# Patient Record
Sex: MALE | Race: OTHER | HISPANIC OR LATINO
[De-identification: names, ages, dates, MRNs, and addresses within clinical notes are randomized per-mention and may not be internally consistent; named-entity substitution may affect disease eponyms.]

---

## 2019-03-26 PROBLEM — Z00.00 ENCOUNTER FOR PREVENTIVE HEALTH EXAMINATION: Status: ACTIVE | Noted: 2019-03-26

## 2019-04-12 ENCOUNTER — APPOINTMENT (OUTPATIENT)
Dept: GASTROENTEROLOGY | Facility: CLINIC | Age: 64
End: 2019-04-12
Payer: COMMERCIAL

## 2019-04-12 VITALS
WEIGHT: 150 LBS | SYSTOLIC BLOOD PRESSURE: 126 MMHG | HEART RATE: 73 BPM | HEIGHT: 66 IN | DIASTOLIC BLOOD PRESSURE: 83 MMHG | TEMPERATURE: 97.89 F | BODY MASS INDEX: 24.11 KG/M2

## 2019-04-12 DIAGNOSIS — Z86.79 PERSONAL HISTORY OF OTHER DISEASES OF THE CIRCULATORY SYSTEM: ICD-10-CM

## 2019-04-12 DIAGNOSIS — Z78.9 OTHER SPECIFIED HEALTH STATUS: ICD-10-CM

## 2019-04-12 DIAGNOSIS — Z86.73 PERSONAL HISTORY OF TRANSIENT ISCHEMIC ATTACK (TIA), AND CEREBRAL INFARCTION W/OUT RESIDUAL DEFICITS: ICD-10-CM

## 2019-04-12 LAB — HEMOCCULT STL QL: NEGATIVE

## 2019-04-12 PROCEDURE — 99213 OFFICE O/P EST LOW 20 MIN: CPT

## 2019-04-12 RX ORDER — POLYETHYLENE GLYCOL 3350, SODIUM CHLORIDE, SODIUM BICARBONATE AND POTASSIUM CHLORIDE WITH LEMON FLAVOR 420; 11.2; 5.72; 1.48 G/4L; G/4L; G/4L; G/4L
420 POWDER, FOR SOLUTION ORAL
Qty: 1 | Refills: 0 | Status: ACTIVE | COMMUNITY
Start: 2019-04-12 | End: 1900-01-01

## 2019-04-12 RX ORDER — LISINOPRIL 10 MG/1
10 TABLET ORAL
Refills: 0 | Status: ACTIVE | COMMUNITY

## 2019-04-12 NOTE — ASSESSMENT
[FreeTextEntry1] : Abdominal Pain: The patient complains of abdominal pain. The patient is to avoid nonsteroidal anti-inflammatory drugs and aspirin. I recommend continue on a trial of Omeprazole 40 mg once a day for 3 months for the symptoms.  \par Dyspepsia: The patient complains of dyspeptic symptoms.  The patient was advised to abide by an anti-gas diet.  The patient was given a pamphlet for anti-gas.  The patient and I reviewed the anti-gas diet at length. The patient is to start on a trial of Phazyme one tablet 3 times a day p.r.n. abdominal pain and gas.\par Upper Endoscopy and Colonoscopy: I recommend an upper endoscopy and colonoscopy to assess the symptoms.  The patient was told of the risks and benefits of the procedure.  The patient was told of the risks of perforation, emergency surgery, bleeding, infections and missed lesions.  The patient agreed and will schedule for the procedure. The patient can take the antihypertensive medication with a sip of water one hour prior to the procedure. . The patient is to be n.p.o. after midnight and bowel prep was given.  The patient is to return for the procedure. \par Colon Cancer Screening: I recommend a colonoscopy to assess for high risk colon cancer screening.  The patient's mother had a history of colon cancer.  The patient was told of the risks and benefits of the procedure.  The patient was told of the risks of perforation, emergency surgery, bleeding, infections and missed lesions.  The patient agreed and will schedule for the procedure. The patient can take the antihypertensive medication with a sip of water one hour prior to the procedure. . The patient is to be n.p.o. after midnight and bowel prep was given.  The patient is to return for the procedure. \par I recommend follow-up in 3 weeks to reassess the symptoms and discuss the findings.\par \par \par \par \par \par \par \par \par

## 2019-04-12 NOTE — HISTORY OF PRESENT ILLNESS
[Heartburn] : denies heartburn [Nausea] : denies nausea [Vomiting] : denies vomiting [Diarrhea] : denies diarrhea [Yellow Skin Or Eyes (Jaundice)] : denies jaundice [Rectal Pain] : denies rectal pain [Constipation] : constipation [Abdominal Pain] : abdominal pain [Abdominal Swelling] : abdominal swelling [Hiatus Hernia] : hiatus hernia [Wt Gain ___ Lbs] : no recent weight gain [Wt Loss ___ Lbs] : no recent weight loss [GERD] : no gastroesophageal reflux disease [Peptic Ulcer Disease] : no peptic ulcer disease [Cholelithiasis] : no cholelithiasis [Pancreatitis] : no pancreatitis [Kidney Stone] : no kidney stone [Inflammatory Bowel Disease] : no inflammatory bowel disease [Irritable Bowel Syndrome] : no irritable bowel syndrome [Diverticulitis] : no diverticulitis [Alcohol Abuse] : no alcohol abuse [Malignancy] : no malignancy [Abdominal Surgery] : no abdominal surgery [Appendectomy] : no appendectomy [de-identified] : The patient states that he is feeling uncomfortable.  The patient is recovering from a CVA.  The patient was hospitalized at [facility] in September 2018 for the new onset CVA.  The patient denies any jaundice or pruritus.  The patient complains of chronic lower back pain. The patient complains of abdominal pain.  The patient describes the abdominal pain as a crampy, burning, intermittent diffuse abdominal discomfort that is nonradiating in nature.  The abdominal pain is unrelated to meals.  The abdominal pain improves with passing gas or having a bowel movement.  The abdominal pain is described as being mild in nature.  The abdominal pain occurs at night and in the morning.  The abdominal pain can occur at any time.   The abdominal pain has awakened the patient from sleep.  The abdominal pain is minimally relieved with certain medication such as proton pump inhibitors, H2 blockers and antacids.  The abdominal pain is associated with abdominal gas and bloating.  The patient denies any nausea or vomiting.  The patient denies any gastroesophageal reflux disease or dysphagia. The patient denies any atypical chest pain, shortness of breath or palpitations.  The patient denies any diaphoresis. The patient complains of constipation but denies any diarrhea.  The patient has 1 to 2 bowel movements e very 1 to 3 days.  The patient complains of a change in bowel habits.  The patient complains of a change in caliber of stool.   The patient denies having mucus discharge with the bowel movements.  The patient denies any bright red blood per rectum, melena or hematemesis.  The patient denies any rectal pain or rectal pruritus.   The patient denies any weight loss or anorexia.  He denies any fevers or chills.  The upper endoscopy performed at the office on August 08, 2017 revealed mild diffuse bile gastritis with linear antral erosions.  The pathology revealed distal esophagus with unremarkable squamous mucosa with no evidence of intestinal metaplasia, eosinophilic esophagitis or dysplasia, gastric antral mucosa with mild chronic gastritis with no evidence of intestinal metaplasia or dysplasia that was negative for Helicobacter pylori, gastric body mucosa with unremarkable gastric mucosa with no evidence of intestinal metaplasia that was negative for Helicobacter pylori and unremarkable duodenal mucosa with no evidence of celiac disease, duodenitis or parasites. The colonoscopy to the terminal ileum performed at the office on April 22, 2014 revealed a normal but long and tortuous colon, a poor prep colonoscopy and small internal hemorrhoids.  The patient tolerated the procedures well.   The abdominal ultrasound performed by his PMD on November 2, 2015 revealed probable nonobstructing renal calculus in the left lower pole with no left hydronephrosis otherwise unremarkable study.  The patient admits to having a prior upper endoscopy performed by another gastroenterologist.  According to the patient, the upper endoscopic findings revealed gastroesophageal reflux disease.  The patient admits to a family history of GI problems.  The patient’s mother had a history of colonic cancer. [Cholecystectomy] : no cholecystectomy

## 2019-04-26 ENCOUNTER — TRANSCRIPTION ENCOUNTER (OUTPATIENT)
Age: 64
End: 2019-04-26

## 2019-05-14 ENCOUNTER — RESULT REVIEW (OUTPATIENT)
Age: 64
End: 2019-05-14

## 2019-05-14 ENCOUNTER — APPOINTMENT (OUTPATIENT)
Dept: GASTROENTEROLOGY | Facility: HOSPITAL | Age: 64
End: 2019-05-14

## 2019-05-14 ENCOUNTER — OUTPATIENT (OUTPATIENT)
Dept: OUTPATIENT SERVICES | Facility: HOSPITAL | Age: 64
LOS: 1 days | End: 2019-05-14
Payer: COMMERCIAL

## 2019-05-14 DIAGNOSIS — K64.8 OTHER HEMORRHOIDS: ICD-10-CM

## 2019-05-14 DIAGNOSIS — K30 FUNCTIONAL DYSPEPSIA: ICD-10-CM

## 2019-05-14 DIAGNOSIS — Z91.89 OTHER SPECIFIED PERSONAL RISK FACTORS, NOT ELSEWHERE CLASSIFIED: ICD-10-CM

## 2019-05-14 DIAGNOSIS — R10.84 GENERALIZED ABDOMINAL PAIN: ICD-10-CM

## 2019-05-14 DIAGNOSIS — K29.30 CHRONIC SUPERFICIAL GASTRITIS WITHOUT BLEEDING: ICD-10-CM

## 2019-05-14 PROCEDURE — 43239 EGD BIOPSY SINGLE/MULTIPLE: CPT

## 2019-05-14 PROCEDURE — 88305 TISSUE EXAM BY PATHOLOGIST: CPT | Mod: 26

## 2019-05-14 PROCEDURE — 88305 TISSUE EXAM BY PATHOLOGIST: CPT

## 2019-05-14 PROCEDURE — 45385 COLONOSCOPY W/LESION REMOVAL: CPT

## 2019-05-14 PROCEDURE — 88312 SPECIAL STAINS GROUP 1: CPT

## 2019-05-14 PROCEDURE — 45385 COLONOSCOPY W/LESION REMOVAL: CPT | Mod: PT

## 2019-05-14 PROCEDURE — 88312 SPECIAL STAINS GROUP 1: CPT | Mod: 26,59

## 2019-05-16 LAB — SURGICAL PATHOLOGY STUDY: SIGNIFICANT CHANGE UP

## 2019-06-17 ENCOUNTER — APPOINTMENT (OUTPATIENT)
Dept: GASTROENTEROLOGY | Facility: CLINIC | Age: 64
End: 2019-06-17
Payer: COMMERCIAL

## 2019-06-17 VITALS
BODY MASS INDEX: 25.71 KG/M2 | DIASTOLIC BLOOD PRESSURE: 74 MMHG | HEIGHT: 66 IN | SYSTOLIC BLOOD PRESSURE: 134 MMHG | HEART RATE: 69 BPM | TEMPERATURE: 98.3 F | OXYGEN SATURATION: 98 % | WEIGHT: 160 LBS

## 2019-06-17 DIAGNOSIS — R10.13 EPIGASTRIC PAIN: ICD-10-CM

## 2019-06-17 DIAGNOSIS — K64.8 OTHER HEMORRHOIDS: ICD-10-CM

## 2019-06-17 PROCEDURE — 99213 OFFICE O/P EST LOW 20 MIN: CPT

## 2019-06-17 RX ORDER — PANTOPRAZOLE 40 MG/1
40 TABLET, DELAYED RELEASE ORAL DAILY
Qty: 30 | Refills: 3 | Status: ACTIVE | COMMUNITY
Start: 2019-06-17 | End: 1900-01-01

## 2019-06-17 NOTE — ASSESSMENT
[FreeTextEntry1] : Abdominal Pain: The patient complains of abdominal pain. The patient is to avoid nonsteroidal anti-inflammatory drugs and aspirin. I recommend a trial of Pantoprazole 40 mg once a day for 6 months for the symptoms.  \par Dyspepsia: The patient complains of dyspeptic symptoms.  The patient was advised to abide by an anti-gas diet.  The patient was given a pamphlet for anti-gas.  The patient and I reviewed the anti-gas diet at length. The patient is to start on a trial of Phazyme one tablet 3 times a day p.r.n. abdominal pain and gas.\par Colonic Polyp: The patient was found to have colonic polyps on recent colonoscopy.  I recommend a repeat colonoscopy in 5 years to reassess for colonic polyps pending patient’s health unless symptomatic.  The patient agreed and will follow up for the procedure. \par Internal Hemorrhoids: The patient is to be started on a trial of Anusol H. C. suppositories one per rectum nightly and Anusol HC2 .5% cream apply to affected area twice a day p.r.n. hemorrhoidal bleeding or pain. \par Peptic Ulcer Disease: The patent has findings of peptic ulcer disease on recent upper endoscopy. The patient is to avoid nonsteroidal anti-inflammatory drugs and aspirin. The patient`s symptoms have improved while taking Pantoprazole 40 mg once a day. I recommend a trial of Pantoprazole 40 mg once a day for 3 to 6 months for ulcer healing. I recommend a repeat upper endoscopy to reassess for ulcer healing in 3 to 6 months. The patient was told of the risks and benefits of the procedure.  The patient was told of the risks of perforation, emergency surgery, bleeding, infections and missed lesions. The patient agreed and will followup for the procedure.\par Hiatal Hernia:  The patient was advised to avoid late-night meals and dietary indiscretions.  The patient was advised to avoid fried and fatty foods.  The patient was advised to abide by an anti-GERD diet. The patient was given a pamphlet for anti-GERD.  The patient and I reviewed the anti-GERD diet at length. I recommend a trial of Pantoprazole 40 mg once a day for 6 months for the symptoms.\par Gastritis: The patient has a history of gastritis. The patient is to avoid nonsteroidal anti-inflammatory drugs and aspirin. I recommend a trial of pantoprazole 40 mg once a day for 3 months for the symptoms. \par Family History of Colon Cancer: The patient's mother had a history of colon cancer.  I recommend a repeat colonoscopy in 5 years to reassess for colonic polyps pending patient’s health unless symptomatic.  The patient agreed and will follow up for the procedure. \par I recommend follow-up in 6 months to reassess the symptoms and discuss the findings.\par

## 2019-06-17 NOTE — HISTORY OF PRESENT ILLNESS
[None] : had no significant interval events [Heartburn] : denies heartburn [Nausea] : denies nausea [Vomiting] : denies vomiting [Diarrhea] : denies diarrhea [Yellow Skin Or Eyes (Jaundice)] : denies jaundice [Constipation] : denies constipation [Rectal Pain] : denies rectal pain [Wt Gain ___ Lbs] : no recent weight gain [Wt Loss ___ Lbs] : no recent weight loss [Abdominal Pain] : abdominal pain [Abdominal Swelling] : abdominal swelling [Hiatus Hernia] : hiatus hernia [GERD] : no gastroesophageal reflux disease [Peptic Ulcer Disease] : peptic ulcer disease [Pancreatitis] : no pancreatitis [Cholelithiasis] : no cholelithiasis [Kidney Stone] : no kidney stone [Inflammatory Bowel Disease] : no inflammatory bowel disease [Irritable Bowel Syndrome] : no irritable bowel syndrome [Diverticulitis] : no diverticulitis [Alcohol Abuse] : no alcohol abuse [Malignancy] : no malignancy [Abdominal Surgery] : no abdominal surgery [Appendectomy] : no appendectomy [Cholecystectomy] : no cholecystectomy [de-identified] : The patient states that he is feeling better. The patient denies any jaundice or pruritus.  The patient complains of chronic lower back pain. The patient complains of abdominal pain.  The patient describes the abdominal pain as a crampy, burning, intermittent midepigastric and periumbilical discomfort that is nonradiating in nature.  The abdominal pain is unrelated to meals or passing gas or having bowel movements.  The abdominal pain is worse with stress.  The abdominal pain is described as being mild in nature.  The abdominal pain occurs at night and in the morning.  The abdominal pain can occur at any time.   The abdominal pain has never awakened the patient from sleep.  The abdominal pain is relieved with certain medication such as Carafate, proton pump inhibitors, H2 blockers and antacids.  The abdominal pain is associated with abdominal gas and bloating.  The patient denies any nausea or vomiting.  The patient denies any gastroesophageal reflux disease or dysphagia.  The patient denies any atypical chest pain, shortness of breath or palpitations.  The patient denies any diaphoresis. The patient denies any constipation or diarrhea.  The patient has 1 to 2 bowel movements a day.  The patient denies a change in bowel habits.  The patient denies a change in caliber of stool.  The patient denies having mucus discharge with the bowel movements.  The patient denies any bright red blood per rectum, melena or hematemesis.  The patient denies any rectal pain or rectal pruritus.  The patient complains of weight loss but denies any anorexia.  The patient admits to losing 2 pounds over the past 3 months.  He denies any fevers or chills.  The patient had an upper endoscopy and colonoscopy performed at the [facility] on May 14, 2019.  The upper endoscopy performed on May 14, 2019 revealed a small hiatal hernia and mild diffuse gastritis with a small non-bleeding gastric ulcer with clot. Random biopsies were taken of the distal esophagus, antrum, body of stomach and duodenum to assess for esophagitis, gastritis and duodenitis. There were no ulcers or erosions noted. The pathology revealed distal esophagus with unremarkable squamous mucosa with no evidence of fungal microorganisms, gastric antral and body mucosa with mild chronic inactive gastritis that was negative for Helicobacter pylori, ulcerated gastric oxyntic type mucosa (gastric ulcer) and duodenum with unremarkable small intestine mucosa with preserved villous architecture with no evidence of parasites or intraepithelial lymphocytes.  The colonoscopy to the cecum and terminal ileum performed on May 14, 2019 revealed a small rectal polyp and small internal hemorrhoids. The rectal polyp was snared and removed.  There was no bleeding post polypectomy. There were no other polyps, masses, diverticulosis, AVMs or colitis noted. The pathology revealed fragments of food particle with no mucosa identified.  The patient tolerated the procedures well.  The abdominal ultrasound performed by his PMD on November 2, 2015 revealed probable nonobstructing renal calculus in the left lower pole with no left hydronephrosis otherwise unremarkable study. The patient is recovering from a CVA. The patient was hospitalized at [facility] in September 2018 for the new onset CVA.  The patient admits to having a prior upper endoscopy performed by another gastroenterologist. According to the patient, the upper endoscopic findings revealed gastroesophageal reflux disease. The patient admits to a family history of GI problems. The patient’s mother had a history of colonic cancer.

## 2019-10-21 ENCOUNTER — APPOINTMENT (OUTPATIENT)
Dept: GASTROENTEROLOGY | Facility: CLINIC | Age: 64
End: 2019-10-21

## 2019-10-30 ENCOUNTER — APPOINTMENT (OUTPATIENT)
Dept: GASTROENTEROLOGY | Facility: CLINIC | Age: 64
End: 2019-10-30
Payer: COMMERCIAL

## 2019-10-30 VITALS
DIASTOLIC BLOOD PRESSURE: 79 MMHG | TEMPERATURE: 97.7 F | BODY MASS INDEX: 22.66 KG/M2 | SYSTOLIC BLOOD PRESSURE: 137 MMHG | HEART RATE: 67 BPM | HEIGHT: 66 IN | OXYGEN SATURATION: 98 % | WEIGHT: 141 LBS

## 2019-10-30 DIAGNOSIS — K25.3 ACUTE GASTRIC ULCER W/OUT HEMORRHAGE OR PERFORATION: ICD-10-CM

## 2019-10-30 DIAGNOSIS — R93.89 ABNORMAL FINDINGS ON DIAGNOSTIC IMAGING OF OTHER SPECIFIED BODY STRUCTURES: ICD-10-CM

## 2019-10-30 DIAGNOSIS — R11.0 NAUSEA: ICD-10-CM

## 2019-10-30 DIAGNOSIS — Z91.89 OTHER SPECIFIED PERSONAL RISK FACTORS, NOT ELSEWHERE CLASSIFIED: ICD-10-CM

## 2019-10-30 DIAGNOSIS — K22.4 DYSKINESIA OF ESOPHAGUS: ICD-10-CM

## 2019-10-30 PROCEDURE — 99213 OFFICE O/P EST LOW 20 MIN: CPT

## 2019-10-30 RX ORDER — LINACLOTIDE 145 UG/1
145 CAPSULE, GELATIN COATED ORAL
Qty: 30 | Refills: 0 | Status: ACTIVE | COMMUNITY
Start: 2019-10-30 | End: 1900-01-01

## 2019-10-30 NOTE — HISTORY OF PRESENT ILLNESS
[None] : had no significant interval events [Vomiting] : denies vomiting [Diarrhea] : denies diarrhea [Yellow Skin Or Eyes (Jaundice)] : denies jaundice [Rectal Pain] : denies rectal pain [Wt Loss ___ Lbs] : recent [unfilled] ~Upound(s) weight loss [Heartburn] : heartburn [Nausea] : nausea [Constipation] : constipation [Abdominal Pain] : abdominal pain [Abdominal Swelling] : abdominal swelling [GERD] : gastroesophageal reflux disease [Hiatus Hernia] : hiatus hernia [Peptic Ulcer Disease] : peptic ulcer disease [Wt Gain ___ Lbs] : no recent weight gain [Pancreatitis] : no pancreatitis [Cholelithiasis] : no cholelithiasis [Kidney Stone] : no kidney stone [Inflammatory Bowel Disease] : no inflammatory bowel disease [Irritable Bowel Syndrome] : no irritable bowel syndrome [Diverticulitis] : no diverticulitis [Alcohol Abuse] : no alcohol abuse [Malignancy] : no malignancy [Abdominal Surgery] : no abdominal surgery [Appendectomy] : no appendectomy [Cholecystectomy] : no cholecystectomy [de-identified] : The patient states that he is feeling uncomfortable.   The patient admits to having stress and anxiety.  He is currently on Diazepam 5 mg once a day.The patient was recently hospitalized at [facility] for abdominal pain.  According to the patient, a CAT scan of the abdomen and pelvis performed on October 22, 2019 revealed no CT evidence findings to explain the abdominal pain.  The CT examination is suboptimal for assessment for gastric ulcer. The double barium study performed on October 23, 2019 revealed mild esophageal dysmotility.  The patient has an indwelling rosen for enlarged prostate.  The patient is being followed by urologist, Dr. Fletcher Mauricio..  The patient denies any jaundice or pruritus.  The patient complains of chronic lower back pain. The patient complains of abdominal pain.  The patient describes the abdominal pain as a crampy, intermittent diffuse abdominal discomfort that occasionally radiates to the back.  The abdominal pain is unrelated to meals.  The abdominal pain improves with passing gas or having a bowel movement.  The abdominal pain is described as being mild to moderate in nature.  The abdominal pain occurs at night and in the morning.  The abdominal pain can occur at any time.   The abdominal pain has awakened the patient from sleep.  The abdominal pain is not relieved with medication.  The abdominal pain is associated with abdominal gas and bloating.  The patient complains of nausea but denies any vomiting.  The patient denies any gastroesophageal reflux disease or dysphagia.  The patient denies any atypical chest pain, shortness of breath or palpitations.  The patient admits to occasional episodes of diaphoresis.  The patient complains of constipation but denies any diarrhea.  The patient has 1 bowel movement a day.  The patient takes Miralax, Senna and Ducolax for the constipation. The patient complains of a change in bowel habits.  The patient complains of a change in caliber of stool.   The patient denies having mucus discharge with the bowel movements.  The patient denies any bright red blood per rectum, melena or hematemesis.  The patient denies any rectal pain or rectal pruritus.  The patient complains of weight loss and anorexia.  The patient admits to losing 20 pounds over the past 4 months.  He denies any fevers or chills.  The patient had an upper endoscopy and colonoscopy performed at the [facility] endoscopy suite on May 14, 2019. The upper endoscopy performed on May 14, 2019 revealed a small hiatal hernia and mild diffuse gastritis with a small non-bleeding gastric ulcer with clot. Random biopsies were taken of the distal esophagus, antrum, body of stomach and duodenum to assess for esophagitis, gastritis and duodenitis. There were no ulcers or erosions noted. The pathology revealed distal esophagus with unremarkable squamous mucosa with no evidence of fungal microorganisms, gastric antral and body mucosa with mild chronic inactive gastritis that was negative for Helicobacter pylori, ulcerated gastric oxyntic type mucosa (gastric ulcer) and duodenum with unremarkable small intestine mucosa with preserved villous architecture with no evidence of parasites or intraepithelial lymphocytes. The colonoscopy to the cecum and terminal ileum performed on May 14, 2019 revealed a small rectal polyp and small internal hemorrhoids. The rectal polyp was snared and removed. There was no bleeding post polypectomy. There were no other polyps, masses, diverticulosis, AVMs or colitis noted. The pathology revealed fragments of food particle with no mucosa identified. The patient tolerated the procedures well. The abdominal ultrasound performed by his PMD on November 2, 2015 revealed probable nonobstructing renal calculus in the left lower pole with no left hydronephrosis otherwise unremarkable study. The patient is recovering from a CVA. The patient was hospitalized at [facility] in September 2018 for the new onset CVA. The patient admits to having a prior upper endoscopy performed by another gastroenterologist. According to the patient, the upper endoscopic findings revealed gastroesophageal reflux disease. The patient admits to a family history of GI problems. The patient’s mother had a history of colonic cancer.

## 2019-10-30 NOTE — ASSESSMENT
[FreeTextEntry1] : Abdominal Pain: The patient complains of abdominal pain. The patient is to avoid nonsteroidal anti-inflammatory drugs and aspirin. I recommend a trial of Pantoprazole 40 mg once a day for 3 months for the symptoms.  \par Dyspepsia: The patient complains of dyspeptic symptoms.  The patient was advised to abide by an anti-gas diet.  The patient was given a pamphlet for anti-gas.  The patient and I reviewed the anti-gas diet at length. The patient is to start on a trial of Phazyme one tablet 3 times a day p.r.n. abdominal pain and gas.\par GERD: The patient was advised to avoid late-night meals and dietary indiscretions.  The patient was advised to avoid fried and fatty foods.  The patient was advised to abide by an anti-GERD diet. The patient was given a pamphlet for anti-GERD.  The patient and I reviewed the anti-GERD diet at length. I recommend a trial of Pantoprazole 40 mg once a day x 3 months for the symptoms.\par Nausea: The patient complains of nausea. If the symptoms of nausea persists, the patient may require a trial of Zofran 4 mg twice a day. \par Constipation: The patient complains of constipation. I recommend a high-fiber diet. I recommend a trial of a probiotic such as Align once a day. I recommend a trial of Metamucil once a day for fiber supplementation. I recommend a trial of Linzess 145 mcg once a day for the constipation. If the symptoms persist, the patient may require a colonoscopy to assess the symptoms.  The patient was told of the risks and benefits of the procedure.  The patient was told of the risks of perforation, emergency surgery, bleeding, infections and missed lesions.  The patient agreed and will followup to reassess the symptoms.  \par History of Colonic Polyp: The patient was found to have colonic polyps on recent colonoscopy. I recommend a repeat colonoscopy in 5 years to reassess for colonic polyps pending patient’s health unless symptomatic. The patient agreed and will follow up for the procedure. \par Internal Hemorrhoids: The patient is to be started on a trial of Anusol H. C. suppositories one per rectum nightly and Anusol HC2.5% cream apply to affected area twice a day p.r.n. hemorrhoidal bleeding or pain. \par Peptic Ulcer Disease: The patent has findings of peptic ulcer disease on recent upper endoscopy. The patient is to avoid nonsteroidal anti-inflammatory drugs and aspirin. The patient’s symptoms have improved while taking Pantoprazole 40 mg once a day. I recommend continue on a trial of Pantoprazole 40 mg once a day for 3 months for ulcer healing. I recommend a repeat upper endoscopy to reassess for ulcer healing in 3 months. The patient was told of the risks and benefits of the procedure. The patient was told of the risks of perforation, emergency surgery, bleeding, infections and missed lesions. The patient agreed and will followup for the procedure.\par Hiatal Hernia: The patient was advised to avoid late-night meals and dietary indiscretions. The patient was advised to avoid fried and fatty foods. The patient was advised to abide by an anti-GERD diet. The patient was given a pamphlet for anti-GERD. The patient and I reviewed the anti-GERD diet at length. I recommend a trial of Pantoprazole 40 mg once a day for 6 months for the symptoms.\par Gastritis: The patient has a history of gastritis. The patient is to avoid nonsteroidal anti-inflammatory drugs and aspirin. I recommend a trial of pantoprazole 40 mg once a day for 3 months for the symptoms. \par Family History of Colon Cancer: The patient's mother had a history of colon cancer. I recommend a repeat colonoscopy in 5 years to reassess for colonic polyps pending patient’s health unless symptomatic. The patient agreed and will follow up for the procedure.\par Abnormal Imaging Study: The double barium study performed on October 23, 2019 revealed mild esophageal dysmotility. I recommend a nuclear gastric emptying study to assess for gastroparesis.  I would hold off on any pro-motility agents until after the nuclear gastric emptying study.\par I recommend followup for the prostate with urologist, Dr. Fletcher Mauricio.  The patient agreed and will followup. \par The patient was recently hospitalized ant [facility].  I will try to obtain the hospital records.  The patient is to sign the record release.\par Follow-up: The patient is to follow-up in the office in 4 weeks to reassess the symptoms. The patient was told to call the office if any further problems. \par \par \par \par \par \par

## 2019-11-01 ENCOUNTER — RX CHANGE (OUTPATIENT)
Age: 64
End: 2019-11-01

## 2019-11-01 RX ORDER — LUBIPROSTONE 24 UG/1
24 CAPSULE, GELATIN COATED ORAL TWICE DAILY
Qty: 60 | Refills: 3 | Status: ACTIVE | COMMUNITY
Start: 2019-11-01 | End: 1900-01-01

## 2019-11-13 ENCOUNTER — APPOINTMENT (OUTPATIENT)
Dept: NUCLEAR MEDICINE | Facility: HOSPITAL | Age: 64
End: 2019-11-13
Payer: COMMERCIAL

## 2019-11-13 ENCOUNTER — MESSAGE (OUTPATIENT)
Age: 64
End: 2019-11-13

## 2019-11-13 ENCOUNTER — OUTPATIENT (OUTPATIENT)
Dept: OUTPATIENT SERVICES | Facility: HOSPITAL | Age: 64
LOS: 1 days | End: 2019-11-13

## 2019-11-13 DIAGNOSIS — R93.89 ABNORMAL FINDINGS ON DIAGNOSTIC IMAGING OF OTHER SPECIFIED BODY STRUCTURES: ICD-10-CM

## 2019-11-13 PROCEDURE — 78264 GASTRIC EMPTYING IMG STUDY: CPT | Mod: 26

## 2019-11-18 ENCOUNTER — APPOINTMENT (OUTPATIENT)
Dept: GASTROENTEROLOGY | Facility: CLINIC | Age: 64
End: 2019-11-18
Payer: COMMERCIAL

## 2019-11-18 VITALS
TEMPERATURE: 97.8 F | BODY MASS INDEX: 22.66 KG/M2 | WEIGHT: 141 LBS | HEART RATE: 36 BPM | HEIGHT: 66 IN | DIASTOLIC BLOOD PRESSURE: 87 MMHG | OXYGEN SATURATION: 97 % | SYSTOLIC BLOOD PRESSURE: 138 MMHG

## 2019-11-18 DIAGNOSIS — K30 FUNCTIONAL DYSPEPSIA: ICD-10-CM

## 2019-11-18 DIAGNOSIS — K29.30 CHRONIC SUPERFICIAL GASTRITIS W/OUT BLEEDING: ICD-10-CM

## 2019-11-18 DIAGNOSIS — R10.84 GENERALIZED ABDOMINAL PAIN: ICD-10-CM

## 2019-11-18 DIAGNOSIS — K59.04 CHRONIC IDIOPATHIC CONSTIPATION: ICD-10-CM

## 2019-11-18 DIAGNOSIS — K44.9 DIAPHRAGMATIC HERNIA W/OUT OBSTRUCTION OR GANGRENE: ICD-10-CM

## 2019-11-18 DIAGNOSIS — K63.5 POLYP OF COLON: ICD-10-CM

## 2019-11-18 PROCEDURE — 99213 OFFICE O/P EST LOW 20 MIN: CPT

## 2019-11-18 RX ORDER — LINACLOTIDE 72 UG/1
72 CAPSULE, GELATIN COATED ORAL
Qty: 30 | Refills: 3 | Status: ACTIVE | COMMUNITY
Start: 2019-11-18 | End: 1900-01-01

## 2019-11-18 NOTE — HISTORY OF PRESENT ILLNESS
[None] : had no significant interval events [Vomiting] : denies vomiting [Nausea] : denies nausea [Diarrhea] : denies diarrhea [Yellow Skin Or Eyes (Jaundice)] : denies jaundice [Rectal Pain] : denies rectal pain [Wt Loss ___ Lbs] : recent [unfilled] ~Upound(s) weight loss [Heartburn] : heartburn [Constipation] : constipation [Abdominal Pain] : abdominal pain [Abdominal Swelling] : abdominal swelling [GERD] : gastroesophageal reflux disease [Hiatus Hernia] : hiatus hernia [Peptic Ulcer Disease] : peptic ulcer disease [Wt Gain ___ Lbs] : no recent weight gain [Pancreatitis] : no pancreatitis [Cholelithiasis] : no cholelithiasis [Kidney Stone] : no kidney stone [Inflammatory Bowel Disease] : no inflammatory bowel disease [Diverticulitis] : no diverticulitis [Irritable Bowel Syndrome] : no irritable bowel syndrome [Alcohol Abuse] : no alcohol abuse [Malignancy] : no malignancy [Appendectomy] : no appendectomy [Abdominal Surgery] : no abdominal surgery [Cholecystectomy] : no cholecystectomy [de-identified] : The patient states that he is feeling uncomfortable. The patient denies any jaundice or pruritus.  The patient complains of chronic lower back pain. The patient complains of abdominal pain.  The patient describes the abdominal pain as a crampy, intermittent diffuse abdominal discomfort that is nonradiating in nature.  The abdominal pain is worse with stress.  The abdominal pain is worse with meals and improves with passing gas or having a bowel movement.  The abdominal pain is described as being mild to moderate in nature.  The abdominal pain occurs at night and in the morning.  The abdominal pain can occur at any time.   The abdominal pain has never awakened the patient from sleep.  The abdominal pain is slightly relieved with certain medication such as proton pump inhibitors, H2 blockers and antacids.  The abdominal pain is associated with abdominal gas and bloating.  The patient denies any nausea or vomiting.  The patient denies any gastroesophageal reflux disease or dysphagia.  The patient denies any atypical chest pain, shortness of breath or palpitations.  The patient denies any diaphoresis. The patient complains of constipation but denies any diarrhea.  The patient has 2 to 3 bowel movements a day.  The patient complains of a change in bowel habits.  The patient complains of a change in caliber of stool.   The patient developed diarrhea while on Linzess 145 mcg once a day.  The patient denies having mucus discharge with the bowel movements.  The patient denies any bright red blood per rectum, melena or hematemesis.  The patient denies any rectal pain or rectal pruritus.  The patient complains of weight loss but denies any anorexia.  The patient admits to losing 12 pounds over the past 4 months.  The patient complains of weight loss and anorexia. The patient admits to recently gaining back 4 pounds over the past 1 month. He denies any fevers or chills. The patient had an upper endoscopy and colonoscopy performed at the [facility] [place] [facility] on May 14, 2019. The upper endoscopy performed on May 14, 2019 revealed a small hiatal hernia and mild diffuse gastritis with a small non-bleeding gastric ulcer with clot. Random biopsies were taken of the distal esophagus, antrum, body of stomach and duodenum to assess for esophagitis, gastritis and duodenitis. There were no ulcers or erosions noted. The pathology revealed distal esophagus with unremarkable squamous mucosa with no evidence of fungal microorganisms, gastric antral and body mucosa with mild chronic inactive gastritis that was negative for Helicobacter pylori, ulcerated gastric oxyntic type mucosa (gastric ulcer) and duodenum with unremarkable small intestine mucosa with preserved villous architecture with no evidence of parasites or intraepithelial lymphocytes. The colonoscopy to the cecum and terminal ileum performed on May 14, 2019 revealed a small rectal polyp and small internal hemorrhoids. The rectal polyp was snared and removed. There was no bleeding post polypectomy. There were no other polyps, masses, diverticulosis, AVMs or colitis noted. The pathology revealed fragments of food particle with no mucosa identified. The patient tolerated the procedures well. The patient was recently hospitalized at [facility] for abdominal pain. According to the patient, a CAT scan of the abdomen and pelvis performed on October 22, 2019 revealed no CT evidence findings to explain the abdominal pain. The CT examination is suboptimal for assessment for gastric ulcer. The double barium study performed on October 23, 2019 revealed mild esophageal dysmotility.  The patient had a nuclear medicine gastric emptying study performed on November 13, 2019 to assess the symptoms. The nuclear medicine gastric emptying study revealed a normal liquid phase and solid phase gastric emptying. No scan evidence of gastroparesis. The patient has an indwelling rosen for enlarged prostate. The patient is being followed by urologist, Dr. Fletcher Mauricio. The abdominal ultrasound performed by his PMD on November 2, 2015 revealed probable nonobstructing renal calculus in the left lower pole with no left hydronephrosis otherwise unremarkable study. The patient is recovering from a CVA. The patient was hospitalized at [facility] in September 2018 for the new onset CVA. The patient admits to having a prior upper endoscopy performed by another gastroenterologist. According to the patient, the upper endoscopic findings revealed gastroesophageal reflux disease. The patient admits to a family history of GI problems. The patient’s mother had a history of colonic cancer.

## 2019-11-18 NOTE — ASSESSMENT
[FreeTextEntry1] : Abdominal Pain: The patient complains of abdominal pain. The patient is to avoid nonsteroidal anti-inflammatory drugs and aspirin. I recommend a trial of Pantoprazole 40 mg once a day for 3 months for the symptoms.  \par Dyspepsia: The patient complains of dyspeptic symptoms.  The patient was advised to abide by an anti-gas diet.  The patient was given a pamphlet for anti-gas.  The patient and I reviewed the anti-gas diet at length. The patient is to start on a trial of Phazyme one tablet 3 times a day p.r.n. abdominal pain and gas.\par GERD: The patient was advised to avoid late-night meals and dietary indiscretions.  The patient was advised to avoid fried and fatty foods.  The patient was advised to abide by an anti-GERD diet. The patient was given a pamphlet for anti-GERD.  The patient and I reviewed the anti-GERD diet at length. I recommend a trial of Pantoprazole 40 mg once a day x 3 months for the symptoms.\par Constipation: The patient complains of constipation. I recommend a high-fiber diet. I recommend a trial of a probiotic such as Align once a day. I recommend a trial of Metamucil once a day for fiber supplementation.  The patient developed diarrhea on Linzess 145 mcg once a day.   I recommend a trial of Linzess 72 mcg once a day for the constipation. The patient agreed and will followup to reassess the symptoms.  \par History of Colonic Polyp: The patient was found to have colonic polyps on recent colonoscopy. I recommend a repeat colonoscopy in 5 years to reassess for colonic polyps pending patient’s health unless symptomatic. The patient agreed and will follow up for the procedure. \par Internal Hemorrhoids: The patient is to be started on a trial of Anusol H. C. suppositories one per rectum nightly and Anusol HC2.5% cream apply to affected area twice a day p.r.n. hemorrhoidal bleeding or pain. \par Peptic Ulcer Disease: The patent has findings of peptic ulcer disease on recent upper endoscopy. The patient is to avoid nonsteroidal anti-inflammatory drugs and aspirin. The patient’s symptoms have improved while taking Pantoprazole 40 mg once a day. I recommend continue on a trial of Pantoprazole 40 mg once a day for 3 months for ulcer healing. I recommend a repeat upper endoscopy to reassess for ulcer healing in 3 months. The patient was told of the risks and benefits of the procedure. The patient was told of the risks of perforation, emergency surgery, bleeding, infections and missed lesions. The patient agreed and will followup for the procedure.\par Hiatal Hernia: The patient was advised to avoid late-night meals and dietary indiscretions. The patient was advised to avoid fried and fatty foods. The patient was advised to abide by an anti-GERD diet. The patient was given a pamphlet for anti-GERD. The patient and I reviewed the anti-GERD diet at length. I recommend a trial of Pantoprazole 40 mg once a day for 6 months for the symptoms.\par Gastritis: The patient has a history of gastritis. The patient is to avoid nonsteroidal anti-inflammatory drugs and aspirin. I recommend a trial of pantoprazole 40 mg once a day for 3 months for the symptoms. \par Family History of Colon Cancer: The patient's mother had a history of colon cancer. I recommend a repeat colonoscopy in 5 years to reassess for colonic polyps pending patient’s health unless symptomatic. The patient agreed and will follow up for the procedure.\par Abnormal Imaging Study: The double barium study performed on October 23, 2019 revealed mild esophageal dysmotility. The patient had a nuclear medicine gastric emptying study performed on November 13, 2019 to assess the symptoms. The nuclear medicine gastric emptying study revealed a normal liquid phase and solid phase gastric emptying. No scan evidence of gastroparesis.  If the symptoms persist, consider a trial of metoclopramide. \par I recommend followup for the prostate with urologist, Dr. Fletcher Mauricio. The patient agreed and will followup. \par The patient was previously hospitalized ant [facility]. I will try to obtain the hospital records. The patient is to sign the record release.\par Follow-up: The patient is to follow-up in the office in 3 months to reassess the symptoms. The patient was told to call the office if any further problems. \par \par \par \par \par

## 2020-01-08 ENCOUNTER — APPOINTMENT (OUTPATIENT)
Dept: GASTROENTEROLOGY | Facility: CLINIC | Age: 65
End: 2020-01-08

## 2020-10-22 ENCOUNTER — TRANSCRIPTION ENCOUNTER (OUTPATIENT)
Age: 65
End: 2020-10-22